# Patient Record
Sex: FEMALE | Race: WHITE
[De-identification: names, ages, dates, MRNs, and addresses within clinical notes are randomized per-mention and may not be internally consistent; named-entity substitution may affect disease eponyms.]

---

## 2019-01-09 ENCOUNTER — HOSPITAL ENCOUNTER (INPATIENT)
Dept: HOSPITAL 92 - ERS | Age: 54
LOS: 3 days | Discharge: HOME | DRG: 305 | End: 2019-01-12
Attending: INTERNAL MEDICINE | Admitting: INTERNAL MEDICINE
Payer: MEDICARE

## 2019-01-09 ENCOUNTER — HOSPITAL ENCOUNTER (EMERGENCY)
Dept: HOSPITAL 57 - BURERS | Age: 54
Discharge: HOME | End: 2019-01-09
Payer: MEDICARE

## 2019-01-09 VITALS — BODY MASS INDEX: 36.6 KG/M2

## 2019-01-09 DIAGNOSIS — Z90.710: ICD-10-CM

## 2019-01-09 DIAGNOSIS — I10: ICD-10-CM

## 2019-01-09 DIAGNOSIS — I16.0: Primary | ICD-10-CM

## 2019-01-09 DIAGNOSIS — R07.89: ICD-10-CM

## 2019-01-09 DIAGNOSIS — I25.2: ICD-10-CM

## 2019-01-09 DIAGNOSIS — Z98.890: ICD-10-CM

## 2019-01-09 DIAGNOSIS — E78.5: ICD-10-CM

## 2019-01-09 DIAGNOSIS — Z79.02: ICD-10-CM

## 2019-01-09 DIAGNOSIS — R07.9: Primary | ICD-10-CM

## 2019-01-09 DIAGNOSIS — Z87.891: ICD-10-CM

## 2019-01-09 DIAGNOSIS — Z86.73: ICD-10-CM

## 2019-01-09 DIAGNOSIS — I25.10: ICD-10-CM

## 2019-01-09 DIAGNOSIS — G40.909: ICD-10-CM

## 2019-01-09 DIAGNOSIS — F41.8: ICD-10-CM

## 2019-01-09 DIAGNOSIS — F41.9: ICD-10-CM

## 2019-01-09 DIAGNOSIS — Z88.5: ICD-10-CM

## 2019-01-09 DIAGNOSIS — Z90.49: ICD-10-CM

## 2019-01-09 DIAGNOSIS — I73.9: ICD-10-CM

## 2019-01-09 DIAGNOSIS — Z88.8: ICD-10-CM

## 2019-01-09 DIAGNOSIS — F32.9: ICD-10-CM

## 2019-01-09 DIAGNOSIS — G62.9: ICD-10-CM

## 2019-01-09 DIAGNOSIS — Z88.2: ICD-10-CM

## 2019-01-09 LAB
ALBUMIN SERPL BCG-MCNC: 4.5 G/DL (ref 3.5–5)
ALP SERPL-CCNC: 73 U/L (ref 40–150)
ALT SERPL W P-5'-P-CCNC: 35 U/L (ref 8–55)
ANION GAP SERPL CALC-SCNC: 14 MMOL/L (ref 10–20)
APAP SERPL-MCNC: (no result) MCG/ML (ref 10–30)
AST SERPL-CCNC: 23 U/L (ref 5–34)
BASOPHILS # BLD AUTO: 0.1 THOU/UL (ref 0–0.2)
BASOPHILS NFR BLD AUTO: 0.8 % (ref 0–1)
BILIRUB SERPL-MCNC: 1.1 MG/DL (ref 0.2–1.2)
BUN SERPL-MCNC: 12 MG/DL (ref 9.8–20.1)
CALCIUM SERPL-MCNC: 10.5 MG/DL (ref 7.8–10.44)
CHLORIDE SERPL-SCNC: 108 MMOL/L (ref 98–107)
CK SERPL-CCNC: 54 U/L (ref 29–168)
CO2 SERPL-SCNC: 20 MMOL/L (ref 22–29)
CREAT CL PREDICTED SERPL C-G-VRATE: 0 ML/MIN (ref 70–130)
DRUG SCREEN CUTOFF: (no result)
EOSINOPHIL # BLD AUTO: 0.4 THOU/UL (ref 0–0.7)
EOSINOPHIL NFR BLD AUTO: 3.8 % (ref 0–10)
GLOBULIN SER CALC-MCNC: 3.4 G/DL (ref 2.4–3.5)
GLUCOSE SERPL-MCNC: 95 MG/DL (ref 70–105)
HGB BLD-MCNC: 15.6 G/DL (ref 12–16)
LYMPHOCYTES # BLD AUTO: 2.7 THOU/UL (ref 1.2–3.4)
LYMPHOCYTES NFR BLD AUTO: 27.2 % (ref 21–51)
MCH RBC QN AUTO: 32.1 PG (ref 27–31)
MCV RBC AUTO: 92.4 FL (ref 78–98)
MEDTOX CONTROL LINE VALID?: (no result)
MEDTOX READER #: (no result)
MONOCYTES # BLD AUTO: 0.8 THOU/UL (ref 0.11–0.59)
MONOCYTES NFR BLD AUTO: 7.7 % (ref 0–10)
NEUTROPHILS # BLD AUTO: 6 THOU/UL (ref 1.4–6.5)
NEUTROPHILS NFR BLD AUTO: 60.5 % (ref 42–75)
PLATELET # BLD AUTO: 245 THOU/UL (ref 130–400)
POTASSIUM SERPL-SCNC: 3.8 MMOL/L (ref 3.5–5.1)
RBC # BLD AUTO: 4.86 MILL/UL (ref 4.2–5.4)
SALICYLATES SERPL-MCNC: (no result) MG/DL (ref 15–30)
SODIUM SERPL-SCNC: 138 MMOL/L (ref 136–145)
SP GR UR STRIP: 1.01 (ref 1–1.04)
WBC # BLD AUTO: 9.9 THOU/UL (ref 4.8–10.8)

## 2019-01-09 PROCEDURE — 80061 LIPID PANEL: CPT

## 2019-01-09 PROCEDURE — 80306 DRUG TEST PRSMV INSTRMNT: CPT

## 2019-01-09 PROCEDURE — 80048 BASIC METABOLIC PNL TOTAL CA: CPT

## 2019-01-09 PROCEDURE — 82550 ASSAY OF CK (CPK): CPT

## 2019-01-09 PROCEDURE — 81003 URINALYSIS AUTO W/O SCOPE: CPT

## 2019-01-09 PROCEDURE — 96374 THER/PROPH/DIAG INJ IV PUSH: CPT

## 2019-01-09 PROCEDURE — 93005 ELECTROCARDIOGRAM TRACING: CPT

## 2019-01-09 PROCEDURE — 74183 MRI ABD W/O CNTR FLWD CNTR: CPT

## 2019-01-09 PROCEDURE — 93306 TTE W/DOPPLER COMPLETE: CPT

## 2019-01-09 PROCEDURE — 70450 CT HEAD/BRAIN W/O DYE: CPT

## 2019-01-09 PROCEDURE — 96376 TX/PRO/DX INJ SAME DRUG ADON: CPT

## 2019-01-09 PROCEDURE — 84484 ASSAY OF TROPONIN QUANT: CPT

## 2019-01-09 PROCEDURE — 85025 COMPLETE CBC W/AUTO DIFF WBC: CPT

## 2019-01-09 PROCEDURE — 71045 X-RAY EXAM CHEST 1 VIEW: CPT

## 2019-01-09 PROCEDURE — 96375 TX/PRO/DX INJ NEW DRUG ADDON: CPT

## 2019-01-09 PROCEDURE — 80053 COMPREHEN METABOLIC PANEL: CPT

## 2019-01-09 PROCEDURE — 70551 MRI BRAIN STEM W/O DYE: CPT

## 2019-01-09 PROCEDURE — 80307 DRUG TEST PRSMV CHEM ANLYZR: CPT

## 2019-01-09 PROCEDURE — 94760 N-INVAS EAR/PLS OXIMETRY 1: CPT

## 2019-01-09 PROCEDURE — 36415 COLL VENOUS BLD VENIPUNCTURE: CPT

## 2019-01-09 NOTE — CT
CT BRAIN

1/9/19

 

PROVIDED CLINICAL HISTORY:  

Headache.

 

FINDINGS:  

Comparison 10/1/04.

 

Ventricular system is normal in size and morphology. There is no evidence for intracranial hemorrhage
 or mass effect. The extracranial soft tissues and osseous structures demonstrate no acute findings. 


 

IMPRESSION:  

No evidence for intracranial hemorrhage or mass effect.

 

POS: [facility]

## 2019-01-09 NOTE — RAD
PORTABLE CHEST:

 

Date: 1-9-19 

 

An AP portable film at 1704 is compared with a 10-1-04 study. 

 

FINDINGS: 

The heart is normal in size and the lungs are clear. No infiltrate or effusion was seen. There is no 
vascular congestion or edema. 

 

IMPRESSION: 

No acute thoracic finding.

 

POS: HOME

## 2019-01-10 LAB
ANION GAP SERPL CALC-SCNC: 14 MMOL/L (ref 10–20)
BASOPHILS # BLD AUTO: 0 THOU/UL (ref 0–0.2)
BASOPHILS NFR BLD AUTO: 0.2 % (ref 0–1)
BUN SERPL-MCNC: 12 MG/DL (ref 9.8–20.1)
CALCIUM SERPL-MCNC: 9.8 MG/DL (ref 7.8–10.44)
CHD RISK SERPL-RTO: 5.7 (ref ?–4.5)
CHLORIDE SERPL-SCNC: 110 MMOL/L (ref 98–107)
CHOLEST SERPL-MCNC: 232 MG/DL
CO2 SERPL-SCNC: 17 MMOL/L (ref 22–29)
CREAT CL PREDICTED SERPL C-G-VRATE: 0 ML/MIN (ref 70–130)
EOSINOPHIL # BLD AUTO: 0.1 THOU/UL (ref 0–0.7)
EOSINOPHIL NFR BLD AUTO: 0.9 % (ref 0–10)
GLUCOSE SERPL-MCNC: 215 MG/DL (ref 70–105)
HDLC SERPL-MCNC: 41 MG/DL
HGB BLD-MCNC: 14.7 G/DL (ref 12–16)
LDLC SERPL CALC-MCNC: 165 MG/DL
LYMPHOCYTES # BLD: 1.4 THOU/UL (ref 1.2–3.4)
LYMPHOCYTES NFR BLD AUTO: 16.3 % (ref 21–51)
MCH RBC QN AUTO: 32.4 PG (ref 27–31)
MCV RBC AUTO: 96.2 FL (ref 78–98)
MONOCYTES # BLD AUTO: 0.1 THOU/UL (ref 0.11–0.59)
MONOCYTES NFR BLD AUTO: 1.6 % (ref 0–10)
NEUTROPHILS # BLD AUTO: 7.1 THOU/UL (ref 1.4–6.5)
NEUTROPHILS NFR BLD AUTO: 81.1 % (ref 42–75)
PLATELET # BLD AUTO: 218 THOU/UL (ref 130–400)
POTASSIUM SERPL-SCNC: 3.9 MMOL/L (ref 3.5–5.1)
RBC # BLD AUTO: 4.55 MILL/UL (ref 4.2–5.4)
SODIUM SERPL-SCNC: 137 MMOL/L (ref 136–145)
TRIGL SERPL-MCNC: 131 MG/DL (ref ?–150)
TROPONIN I SERPL DL<=0.01 NG/ML-MCNC: (no result) NG/ML (ref ?–0.03)
WBC # BLD AUTO: 8.8 THOU/UL (ref 4.8–10.8)

## 2019-01-10 RX ADMIN — NITROGLYCERIN SCH: 20 OINTMENT TOPICAL at 18:58

## 2019-01-10 RX ADMIN — NITROGLYCERIN SCH: 20 OINTMENT TOPICAL at 23:57

## 2019-01-10 RX ADMIN — Medication SCH ML: at 20:57

## 2019-01-10 RX ADMIN — Medication SCH ML: at 10:20

## 2019-01-10 RX ADMIN — NITROGLYCERIN SCH INCH: 20 OINTMENT TOPICAL at 05:06

## 2019-01-10 NOTE — HP
CHIEF COMPLAINT:  Chest pain and elevated blood pressures.



HISTORY OF PRESENT ILLNESS:  This is a 53-year-old female with past medical history

of hypertension, hyperlipidemia, epilepsy, MI x3 in ,  and 2018, CVA in

, PVD, neuropathy, presenting with elevated blood pressure and chest pain.  The

patient states that she went to the ED because her blood pressures were elevated and

she was checking her blood pressure several times and she noted the blood pressures

were in the 200s systolic.  Due to that, the patient went to the ER and the patient

was transferred to the hospital from the [facility] because of the elevated blood

pressures, so the patient can further be evaluated and treated.  At this point, the

patient is endorsing some headaches and chest discomfort.  The patient describes her

discomfort as pressure-like in nature and it does not radiate and is localized. 



Previous history is that, per the patient, only fentanyl is the only medication that

is able to help her with her headache and her chest discomfort.  The patient states

that she is allergic to numerous medications and the only medication she is not

allergic to is Demerol and Dilaudid.  At this point, the patient is requesting for

fentanyl. 



REVIEW OF SYSTEMS:  Positive for chest pain, headaches, elevated blood pressure.

Otherwise, all other systems have been reviewed and are negative. 



PAST MEDICAL HISTORY:  Hypertension, hyperlipidemia, epilepsy, MI x3, TIA, PVD,

neuropathy. 



FAMILY HISTORY:  Reviewed and noncontributory to this visit.



PAST SURGICAL HISTORY:  Stents in the iliac artery, abdominal aortic stent, cardiac

stent, mouth surgery, surgical history of appendectomy, surgical history of

cholecystectomy and surgical history of hysterectomy. 



PSYCHIATRIC HISTORY:  Includes anxiety and depression.



SOCIAL HISTORY:  The patient denies any alcohol use currently.  The patient abuses

marijuana, former tobacco user.  The patient smokes cigarettes.  The patient lives

with . 



ALLERGIES:  THE PATIENT IS ALLERGIC TO DEPAKOTE, DILANTIN, MORPHINE, PENICILLINS,

PHENOBARBITAL, TEGRETOL, TORADOL, ULTRAM, ZOFRAN, AND THE PATIENT STATES THAT THERE

ARE MANY MORE. 



CURRENT MEDICATIONS:  Currently, the patient takes:

1. Carvedilol 6.25.

2. Clopidogrel 75 mg.

3. Losartan 100 mg.

4. Nitroglycerin 0.4 mg.

5. Topamax 200 mg b.i.d.



PHYSICAL EXAMINATION:

VITAL SIGNS:  The patient's blood pressure is 166/102, pulse of 76, respiratory rate

of 18, temperature of 98.3, oxygen saturation of 97%. 

GENERAL:  The patient is currently lying in bed, does not appear to be in any acute

distress.  The patient is angry because the patient has not received fentanyl. 

HEENT:  Normocephalic and atraumatic.  Pupils are equally round and reactive to

light.  Extraocular movements are intact.  No scleral icterus.  No conjunctival

pallor. 

NECK:  Trachea is midline.  Full range of motion.  No JVD. 

LUNGS:  Clear to auscultation bilaterally.  No wheezing, no rhonchi, no rales

appreciated. 

CARDIAC:  Positive S1 and S2.  Regular rate and rhythm.  No murmurs, no gallops, no

rubs appreciated. 

EXTREMITIES:  Upper extremity, the patient has 5/5 upper extremity strength and 5/5

lower extremity strength.  The patient does not have any edema.  The patient has

good pulses bilaterally at the upper and lower extremities. 

NEUROLOGIC:  Cranial nerves 2 through 12 grossly intact.  No neurologic deficits

noted. 

SKIN:  Warm, dry, and intact.



DIAGNOSTIC DATA:  EK-lead EKG shows sinus rhythm with a rate of 67, and there

is nonspecific T-wave abnormalities and RI interval at 138. 



IMAGING DATA:  CT of the head is negative. 



Chest x-ray is negative.



LABORATORY DATA:  WBC is 8.8, __________, and platelet is 218. 



Electrolytes:  Sodium is 137, potassium is 3.9, chloride is 110, carbon dioxide of

17, creatinine is 0.95.  Cholesterol is 232.  Troponins are negative. 



ASSESSMENT AND PLAN:  

1. This is a 53-year-old female, being admitted for elevated blood pressures.  At

this point, the patient's blood pressure is in the 160 systolic.  We have started

the patient on p.r.n. hydralazine and nitroglycerin patch to help with blood

pressures.  We will continue the patient on this blood pressure regimen and we will

adjust the patient's blood pressure medications accordingly to treat her blood

pressure. 

2. Chest discomfort.  Currently, the patient's troponins have been negative.  We

will follow up on morning exams that we have ordered and if they are negative, the

patient can able to be discharged home. 

3. History of seizures.  We will continue the patient on her home medications.

4. History of transient ischemic attacks, currently stable.  We will continue the

patient on her home medications. 

5. Hyperlipidemia.  Continue the patient on home medication.

6. Hypertension.  The patient is currently hypertensive.  We will continue the

patient on home medications and treat the patient's elevated blood pressure with

other p.r.n. blood medications. 

7. Deep venous thrombosis and gastrointestinal prophylaxis.







Job ID:  [id number]

## 2019-01-11 RX ADMIN — NITROGLYCERIN SCH: 20 OINTMENT TOPICAL at 05:30

## 2019-01-11 RX ADMIN — Medication SCH ML: at 20:14

## 2019-01-11 RX ADMIN — NITROGLYCERIN SCH: 20 OINTMENT TOPICAL at 20:15

## 2019-01-11 RX ADMIN — Medication SCH ML: at 09:51

## 2019-01-11 RX ADMIN — FAMOTIDINE SCH: 10 INJECTION, SOLUTION INTRAVENOUS at 22:55

## 2019-01-11 RX ADMIN — NITROGLYCERIN SCH: 20 OINTMENT TOPICAL at 16:14

## 2019-01-11 RX ADMIN — FAMOTIDINE SCH: 10 INJECTION, SOLUTION INTRAVENOUS at 22:54

## 2019-01-11 NOTE — PDOC.PN
- Subjective


Encounter Start Date: 01/11/19


Encounter Start Time: 12:20





Patient seen and examined, states that she has been having high BP for the past 

few weeks on and off with headaches, facial flushing as well as palpitations, 

states that her BP at home is in the 200s when these episodes happen. The 

patient has a log which she showed me, no other issues or complaints, no 

alleviating or aggravating factors. 





- Objective


Resuscitation Status - Order Detail:





01/10/19 00:15


Resuscitation Status Routine 


   Resuscitation Status: FULL: Full Resuscitation








Vital Signs & Weight: 


 Vital Signs (12 hours)











  Temp Pulse Resp BP BP Pulse Ox


 


 01/11/19 11:30  98.2 F  61  18  175/83 H   97


 


 01/11/19 07:37  98.2 F  72  16   131/75  99


 


 01/11/19 05:18  97.8 F  53 L  12  106/58 L   98








 Weight











Weight                         234 lb














I&O: 


 











 01/10/19 01/11/19 01/12/19





 06:59 06:59 06:59


 


Intake Total  736 


 


Balance  736 











Result Diagrams: 


 01/10/19 03:55





 01/10/19 03:55





Phys Exam





- Physical Examination


Constitutional: NAD


HEENT: PERRLA, moist MMs, sclera anicteric


Neck: no nodes, no JVD, supple, full ROM


Respiratory: no wheezing, no rales, no rhonchi


Cardiovascular: RRR, no significant murmur, no rub


Gastrointestinal: soft, non-tender, no distention, positive bowel sounds


Musculoskeletal: no edema, pulses present





Dx/Plan


(1) Hypertensive urgency


Code(s): I16.0 - HYPERTENSIVE URGENCY   Status: Acute   





(2) Tachycardia


Code(s): R00.0 - TACHYCARDIA, UNSPECIFIED   Status: Acute   





(3) Palpitations


Code(s): R00.2 - PALPITATIONS   Status: Acute   





- Plan





* dizziness present, will check MRI brain to r/o stroke


* check MRI abd/pelvis to r/o pheo


* target -140mmHg


* DC plans in AM if BP stable and patient feeling well and MRI negative


* case and plan d/w patient at length, she understood and agreed with this plan

## 2019-01-11 NOTE — CON
DATE OF CONSULTATION:  



HISTORY OF PRESENT ILLNESS:  The patient is an unfortunate 53-year-old woman with a

history of severe coronary artery disease and peripheral vascular disease, who

presented with a markedly elevated blood pressure, headaches, and chest discomfort.

The patient has previously undergone multiple peripheral vascular stents including

iliac stents and abdominal stent.  In 2018, she suffered a myocardial infarction,

who apparently had a stent placed into the LAD.  The patient states she also

previously suffered a cerebrovascular accident.  The patient was in her usual state

of health, when she started having headaches.  She noted that her blood pressure was

markedly elevated.  She presented to the emergency room.  The patient was admitted

for further evaluation. 



PAST MEDICAL HISTORY:  Significant for

1. Coronary artery disease.

2. Peripheral vascular disease.

3. Hypertension.

4. Diverticulosis.

5. Hyperlipidemia.

6. Myocardial infarction.



PAST SURGICAL HISTORY:  She has had;

1. Appendectomy.

2. Cholecystectomy.

3. Hysterectomy.

4. Mouth surgery.



ALLERGIES:  SHE IS ALLERGIC TO DEPAKOTE, DILANTIN, MORPHINE, PENICILLIN, TORADOL,

FENOFIBRATE, ZOFRAN. 



FAMILY HISTORY:  Positive for family history of heart disease.



MEDICATIONS ON ADMISSION:  Include;

1. Carvedilol 6.25 b.i.d.

2. Plavix 75 daily.

3. Losartan 100 daily.

4. Topamax 200 b.i.d.

5. Zetia 10 daily.



SOCIAL HISTORY:  The patient is a former smoker, quit 1 year ago.



REVIEW OF SYSTEMS:  A 10-point system otherwise unremarkable.



PHYSICAL EXAMINATION:

GENERAL:  Obese woman, who is anxious. 

VITAL SIGNS:  Blood pressure of 149/94. 

NECK:  Showed no jugular venous distention. 

LUNGS:  Clear to auscultation. 

HEART:  Regular rate and rhythm.  Normal S1 and S2.  No murmurs. 

ABDOMEN:  Distended. 

EXTREMITIES:  Showed trace edema. 

VASCULAR:  Radial pulses are 2+.



LABORATORY DATA:  Sodium 137, potassium 3.9, chloride 110, bicarbonate 17, BUN 12,

creatinine 0.95, and glucose 215.  Troponin less than 0.01.  Cholesterol was 232 and

LDL was 165.  White blood cell count 8.8, hemoglobin 14.7, hematocrit 43.7, and

platelets were 218.  Her EKG revealed normal sinus rhythm, nonspecific T-wave

abnormality. 



IMPRESSION:  

1. Hypertensive crisis.

2. History of myocardial infarction.

3. Chest discomfort.

4. History of abdominal aortic aneurysm stenting and iliac stenting.

5. History of percutaneous transluminal coronary angioplasty and stent placed in the

left anterior descending. 

6. History of tobacco abuse.

7. Dyslipidemia.



PLAN:  This patient presents with a hypertensive crisis.  Her blood pressure was

markedly elevated with systolic blood pressure over 200.  The patient's cardiac

enzymes revealed no evidence of myocardial infarction.  From a cardiac standpoint,

we would recommend adding a low dose of Norvasc to slowly decrease her blood

pressure.  We will check an echocardiogram.  We would restart the patient on Zetia.

We will follow this patient with you through her hospitalization. 







Job ID:  [id number]

## 2019-01-12 VITALS — TEMPERATURE: 97.5 F

## 2019-01-12 VITALS — SYSTOLIC BLOOD PRESSURE: 112 MMHG | DIASTOLIC BLOOD PRESSURE: 66 MMHG

## 2019-01-12 RX ADMIN — Medication SCH ML: at 08:30

## 2019-01-12 RX ADMIN — NITROGLYCERIN SCH: 20 OINTMENT TOPICAL at 05:02

## 2019-01-12 NOTE — MRI
MRI OF THE ABDOMEN WITH AND WITHOUT CONTRAST:

 

DATE: 1/11/2019.

 

HISTORY: 

Hypertension, headache, dizziness, assess for a pheochromocytoma.

 

TECHNIQUE: 

Multiplanar, multisequence MR imaging of the abdomen is provided with and without contrast.

 

FINDINGS: 

Coronal HASTE imaging demonstrates no free fluid within the imaged abdomen/pelvis.  There are a few t
iny T2 hyperintensities within the hepatic parenchyma, too small to characterize.

 

The liver is not fully imaged on this examination.

 

There is signal loss on out-of-phase imaging throughout the hepatic parenchyma suggesting diffuse hep
atic steatosis.  Spleen appears grossly unremarkable as does the pancreas, the adrenal glands, and th
e kidneys.

 

Imaged retroperitoneum demonstrates no abnormality.

 

There is metallic susceptibility artifact overlying the infrarenal abdominal aorta, etiology uncertai
n.  Question a history of prior aortic surgery and/or endovascular procedure.  Correlation with radio
graphs of abdomen and clinical history advised.

 

Osseous structures demonstrate no acute findings.  Vascular structures are otherwise unremarkable.

 

IMPRESSION: 

1.  No adrenal mass lesion seen on either side.

 

2.  Hepatic steatosis.

 

3.  Susceptibility artifact obscuring the infrarenal abdominal aorta, etiology uncertain.  Clinical c
orrelation is required.  Radiograph of abdomen may be beneficial.

 

CODE T

 

POS: [facility]

## 2019-01-12 NOTE — EKG
Test Reason : CP

Blood Pressure : ***/*** mmHG

Vent. Rate : 067 BPM     Atrial Rate : 067 BPM

   P-R Int : 138 ms          QRS Dur : 092 ms

    QT Int : 412 ms       P-R-T Axes : 058 032 067 degrees

   QTc Int : 435 ms

 

Normal sinus rhythm

Nonspecific T wave abnormality

Abnormal ECG

 

Confirmed by YOSEPH ORELLANA, ABDON ([id number]),  KAYA MCCOLLUM ([id number]) on 1/12/2019 10:59:21 PM

 

Referred By:             Confirmed By:ABDON HAMEED MD

## 2019-01-14 NOTE — DIS
DATE OF ADMISSION:  01/11/2019



DATE OF DISCHARGE:  01/12/2019



ADMITTING DIAGNOSES:  Hypertensive urgency, palpitations, tachycardia, and headache.



DISCHARGE DIAGNOSES:  Hypertensive urgency, resolved.  Palpitations, resolved.

Tachycardia, resolved.  Headache, resolved. 



HOSPITAL COURSE:  This is a 53-year-old female, who came to the hospital complaining

of significant severe headaches as well as hypertension.  Systolic blood pressures

were found to be greater than 200.  The patient was admitted to Internal Medicine

Team, had evaluation done by Internal Medicine and Cardiology.  The patient was

stating that she was having some headaches as well as sweating and palpitations.

The patient had an MRI of the abdomen done to check for any adrenal masses, none

were found.  The patient also had an MRI of her brain done, which was completely

normal with no issues coming up.  The patient at point in time of discharge, denied

any nausea, vomiting, diarrhea, constipation, chest pain, fevers, chills, or

shortness of breath.  Lab work was negative.  Urinalysis was negative.  Her drug

screen was positive for cannabis as well as acetaminophen and opioids.  The patient

was advised to consider discontinuation of cannabis.  At point in time of discharge,

the patient was stable, was given prescriptions for her medication.  She was to take

her medications as directed, was started on Norvasc 5 twice a day, Coreg continued

6.25 twice a day, p.r.n. clonidine 0.1 given, and Lasix 40 mg daily given.  Her

losartan was switched to valsartan as currently there was a recall in losartan.  The

patient was advised to stop any blood pressure medications if her systolic blood

pressure was less than 120.  Advised to take blood pressure daily for the next 3

weeks.  Keep a log of it, go to her primary care doctor and have her medications

adjusted as needed.  Once again, at point in time of discharge, the patient denies

any nausea, vomiting, diarrhea, constipation, chest pain, fevers, chills, or

shortness of breath.  The patient's condition was stable. 



DISPOSITION:  Home.



FOLLOWUP:  Followup with PCP in 3 weeks.



MEDICATIONS:  See MAR.



ACTIVITY:  As tolerated with assistance as needed.



DIET:  Low-fat, low-calorie, and high-fiber diet.



CONDITION:  Stable.



PROGNOSIS:  Good. 



Case and plan discussed with the patient at length.  She understand and agreed with

this plan. 







Job ID:  [id number]